# Patient Record
Sex: MALE | Race: WHITE | NOT HISPANIC OR LATINO | URBAN - METROPOLITAN AREA
[De-identification: names, ages, dates, MRNs, and addresses within clinical notes are randomized per-mention and may not be internally consistent; named-entity substitution may affect disease eponyms.]

---

## 2017-04-03 ENCOUNTER — NON-PROVIDER VISIT (OUTPATIENT)
Dept: URGENT CARE | Facility: CLINIC | Age: 57
End: 2017-04-03

## 2017-04-03 DIAGNOSIS — Z02.1 PRE-EMPLOYMENT DRUG SCREENING: ICD-10-CM

## 2017-04-03 LAB
AMP AMPHETAMINE: NORMAL
COC COCAINE: NORMAL
INT CON NEG: NORMAL
INT CON POS: NORMAL
MET METHAMPHETAMINES: NORMAL
OPI OPIATES: NORMAL
PCP PHENCYCLIDINE: NORMAL
POC DRUG COMMENT 753798-OCCUPATIONAL HEALTH: NORMAL
THC: NORMAL

## 2017-04-03 PROCEDURE — 80305 DRUG TEST PRSMV DIR OPT OBS: CPT | Performed by: PHYSICIAN ASSISTANT

## 2017-04-21 ENCOUNTER — NON-PROVIDER VISIT (OUTPATIENT)
Dept: URGENT CARE | Facility: CLINIC | Age: 57
End: 2017-04-21

## 2017-04-21 DIAGNOSIS — Z02.1 PRE-EMPLOYMENT DRUG TESTING: ICD-10-CM

## 2017-04-21 PROCEDURE — 8907 PR URINE COLLECT ONLY: Performed by: PHYSICIAN ASSISTANT

## 2019-03-07 ENCOUNTER — EMERGENCY (EMERGENCY)
Facility: HOSPITAL | Age: 59
LOS: 1 days | Discharge: HOME | End: 2019-03-07
Attending: STUDENT IN AN ORGANIZED HEALTH CARE EDUCATION/TRAINING PROGRAM
Payer: SELF-PAY

## 2019-03-07 VITALS
RESPIRATION RATE: 18 BRPM | HEART RATE: 51 BPM | OXYGEN SATURATION: 99 % | DIASTOLIC BLOOD PRESSURE: 80 MMHG | SYSTOLIC BLOOD PRESSURE: 144 MMHG

## 2019-03-07 VITALS — HEIGHT: 71 IN | WEIGHT: 205.03 LBS

## 2019-03-07 DIAGNOSIS — Z98.890 OTHER SPECIFIED POSTPROCEDURAL STATES: Chronic | ICD-10-CM

## 2019-03-07 DIAGNOSIS — Z90.49 ACQUIRED ABSENCE OF OTHER SPECIFIED PARTS OF DIGESTIVE TRACT: Chronic | ICD-10-CM

## 2019-03-07 LAB
ALBUMIN SERPL ELPH-MCNC: 4.4 G/DL — SIGNIFICANT CHANGE UP (ref 3.3–5.2)
ALP SERPL-CCNC: 84 U/L — SIGNIFICANT CHANGE UP (ref 40–120)
ALT FLD-CCNC: 19 U/L — SIGNIFICANT CHANGE UP
ANION GAP SERPL CALC-SCNC: 14 MMOL/L — SIGNIFICANT CHANGE UP (ref 5–17)
AST SERPL-CCNC: 21 U/L — SIGNIFICANT CHANGE UP
BASOPHILS # BLD AUTO: 0 K/UL — SIGNIFICANT CHANGE UP (ref 0–0.2)
BASOPHILS NFR BLD AUTO: 0.1 % — SIGNIFICANT CHANGE UP (ref 0–2)
BILIRUB SERPL-MCNC: 0.5 MG/DL — SIGNIFICANT CHANGE UP (ref 0.4–2)
BUN SERPL-MCNC: 18 MG/DL — SIGNIFICANT CHANGE UP (ref 8–20)
CALCIUM SERPL-MCNC: 9.9 MG/DL — SIGNIFICANT CHANGE UP (ref 8.6–10.2)
CHLORIDE SERPL-SCNC: 103 MMOL/L — SIGNIFICANT CHANGE UP (ref 98–107)
CO2 SERPL-SCNC: 23 MMOL/L — SIGNIFICANT CHANGE UP (ref 22–29)
CREAT SERPL-MCNC: 1 MG/DL — SIGNIFICANT CHANGE UP (ref 0.5–1.3)
EOSINOPHIL # BLD AUTO: 0.2 K/UL — SIGNIFICANT CHANGE UP (ref 0–0.5)
EOSINOPHIL NFR BLD AUTO: 2.2 % — SIGNIFICANT CHANGE UP (ref 0–5)
GLUCOSE SERPL-MCNC: 101 MG/DL — SIGNIFICANT CHANGE UP (ref 70–115)
HCT VFR BLD CALC: 41.8 % — LOW (ref 42–52)
HGB BLD-MCNC: 14.3 G/DL — SIGNIFICANT CHANGE UP (ref 14–18)
LYMPHOCYTES # BLD AUTO: 2.1 K/UL — SIGNIFICANT CHANGE UP (ref 1–4.8)
LYMPHOCYTES # BLD AUTO: 26.5 % — SIGNIFICANT CHANGE UP (ref 20–55)
MCHC RBC-ENTMCNC: 31.1 PG — HIGH (ref 27–31)
MCHC RBC-ENTMCNC: 34.2 G/DL — SIGNIFICANT CHANGE UP (ref 32–36)
MCV RBC AUTO: 90.9 FL — SIGNIFICANT CHANGE UP (ref 80–94)
MONOCYTES # BLD AUTO: 0.8 K/UL — SIGNIFICANT CHANGE UP (ref 0–0.8)
MONOCYTES NFR BLD AUTO: 9.7 % — SIGNIFICANT CHANGE UP (ref 3–10)
NEUTROPHILS # BLD AUTO: 4.9 K/UL — SIGNIFICANT CHANGE UP (ref 1.8–8)
NEUTROPHILS NFR BLD AUTO: 61.1 % — SIGNIFICANT CHANGE UP (ref 37–73)
PLATELET # BLD AUTO: 200 K/UL — SIGNIFICANT CHANGE UP (ref 150–400)
POTASSIUM SERPL-MCNC: 4 MMOL/L — SIGNIFICANT CHANGE UP (ref 3.5–5.3)
POTASSIUM SERPL-SCNC: 4 MMOL/L — SIGNIFICANT CHANGE UP (ref 3.5–5.3)
PROT SERPL-MCNC: 7.2 G/DL — SIGNIFICANT CHANGE UP (ref 6.6–8.7)
RBC # BLD: 4.6 M/UL — SIGNIFICANT CHANGE UP (ref 4.6–6.2)
RBC # FLD: 13.3 % — SIGNIFICANT CHANGE UP (ref 11–15.6)
SODIUM SERPL-SCNC: 140 MMOL/L — SIGNIFICANT CHANGE UP (ref 135–145)
T4 AB SER-ACNC: 7.6 UG/DL — SIGNIFICANT CHANGE UP (ref 4.5–12)
TSH SERPL-MCNC: 1.92 UIU/ML — SIGNIFICANT CHANGE UP (ref 0.27–4.2)
WBC # BLD: 8 K/UL — SIGNIFICANT CHANGE UP (ref 4.8–10.8)
WBC # FLD AUTO: 8 K/UL — SIGNIFICANT CHANGE UP (ref 4.8–10.8)

## 2019-03-07 PROCEDURE — 84443 ASSAY THYROID STIM HORMONE: CPT

## 2019-03-07 PROCEDURE — 70491 CT SOFT TISSUE NECK W/DYE: CPT

## 2019-03-07 PROCEDURE — 80053 COMPREHEN METABOLIC PANEL: CPT

## 2019-03-07 PROCEDURE — 70450 CT HEAD/BRAIN W/O DYE: CPT

## 2019-03-07 PROCEDURE — 84436 ASSAY OF TOTAL THYROXINE: CPT

## 2019-03-07 PROCEDURE — 99284 EMERGENCY DEPT VISIT MOD MDM: CPT | Mod: 25

## 2019-03-07 PROCEDURE — 96374 THER/PROPH/DIAG INJ IV PUSH: CPT | Mod: XU

## 2019-03-07 PROCEDURE — 70450 CT HEAD/BRAIN W/O DYE: CPT | Mod: 26

## 2019-03-07 PROCEDURE — 85027 COMPLETE CBC AUTOMATED: CPT

## 2019-03-07 PROCEDURE — 99284 EMERGENCY DEPT VISIT MOD MDM: CPT

## 2019-03-07 PROCEDURE — 70491 CT SOFT TISSUE NECK W/DYE: CPT | Mod: 26

## 2019-03-07 PROCEDURE — 36415 COLL VENOUS BLD VENIPUNCTURE: CPT

## 2019-03-07 RX ORDER — METOCLOPRAMIDE HCL 10 MG
10 TABLET ORAL ONCE
Qty: 0 | Refills: 0 | Status: COMPLETED | OUTPATIENT
Start: 2019-03-07 | End: 2019-03-07

## 2019-03-07 RX ADMIN — Medication 10 MILLIGRAM(S): at 20:54

## 2019-03-07 NOTE — ED STATDOCS - CLINICAL SUMMARY MEDICAL DECISION MAKING FREE TEXT BOX
Pt with headache, intermittent for several week, getting worse today. Pt states dx with thyroid nodule while in [place]. Feels pain is starting in that location. Willbotain basic labs including thyroid panel, CT head and neck, and reassess.

## 2019-03-07 NOTE — ED STATDOCS - NS ED ROS FT
No fever/chills, No eye pain/changes in vision, No ear pain/sore throat/dysphagia, No chest pain/palpitations, no SOB/cough/wheeze/stridor, No abdominal pain, No N/V/D, no dysuria/frequency/discharge, No neck/back pain, no rash, no changes in neurological status/function.  + headache, photophobia

## 2019-03-07 NOTE — ED STATDOCS - PROGRESS NOTE DETAILS
KARINE Ellis NOTE: Pt evaluated at bedside. Pt evaluated prior by intake physician. Otherwise HPI/PE/ROS as noted above. Will follow up plan per intake physician KARINE Ellis NOTE: Reviewed labs/Ct with attending. Pt reports improvement in headache s/p medications.  D/w Dr. Cardenas, will d/c home with PMD f/u. Pt has PMD to follow with. Pt informed of CT results and necessity of follow up for potential malignancy in thyroid nodule. KARINE Ellis NOTE: Patient stable for discharge, reports improvement in pain, vital signs stable, tolerating PO, ambulatory in ED.  Discussion with pt includes but is not limited to: results, plan, proper medication use and side effects, and return precautions. Patient will follow up with their PMD in 1-2 days and any specialists discussed. Advised patient to seek immediate medical attention for any new/worsening symptoms or concerns.  Patient provided with ample opportunity to ask questions which were answered to the fullest extent.  Patient given printed copies of lab/radiology results to aid in proper outpatient follow up. Patient verbalized understanding and agreement of plan.

## 2019-03-07 NOTE — ED ADULT TRIAGE NOTE - CHIEF COMPLAINT QUOTE
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 " Pt was in [facility] in November Pt A & OX4. Pt states he can't think clear and doesn't feel like himself.

## 2019-03-07 NOTE — ED STATDOCS - OBJECTIVE STATEMENT
Pt is a 59 y/o M presenting to the ED with c/o headache. 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 Pain is throbbing in nature, radiates into his ear and shoulder, and has progressively worsened. Associated photosensitivity. Denies dizziness, syncope, CP, SOB, fever, and neck pain. Pt notes that he ran out of his BP medications yesterday. Was evaluated for the same while in [facility] and found to have nodule in thyroid.

## 2019-03-07 NOTE — ED ADULT NURSE NOTE - NSIMPLEMENTINTERV_GEN_ALL_ED
Implemented All Universal Safety Interventions:  [place] to call system. Call bell, personal items and telephone within reach. Instruct patient to call for assistance. Room bathroom lighting operational. Non-slip footwear when patient is off stretcher. Physically safe environment: no spills, clutter or unnecessary equipment. Stretcher in lowest position, wheels locked, appropriate side rails in place.

## 2019-03-07 NOTE — ED ADULT NURSE NOTE - OBJECTIVE STATEMENT
Assumed pt care at 2000.  pt awake, alert and oriented x3 c/o right sided neck pain and headache.  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  Pt also notes light sensitivity.  denies N/V/D or visual changes.  Neuro intact.  Respirations even and unlabored, denies chest pain.  Abdomen soft, nontender, nondistended.  Skin warm and dry.  Moving all extremities well and with purpose.

## 2019-03-07 NOTE — ED STATDOCS - PHYSICAL EXAMINATION
Constitutional - well-developed; well nourished. Head - NCAT. Airway patent. Eyes - PERRL. CV - RRR. no murmur. no edema. Pulm - CTAB. Abd - soft, nt. no rebound. no guarding. Neuro - A&Ox3. strength 5/5 x4. sensation intact x4. normal gait. CN II-XII intact, Skin - No rash. MSK - normal ROM.

## 2019-04-18 ENCOUNTER — EMERGENCY (EMERGENCY)
Facility: HOSPITAL | Age: 59
LOS: 1 days | End: 2019-04-18
Attending: EMERGENCY MEDICINE
Payer: SELF-PAY

## 2019-04-18 VITALS
DIASTOLIC BLOOD PRESSURE: 95 MMHG | OXYGEN SATURATION: 97 % | SYSTOLIC BLOOD PRESSURE: 158 MMHG | HEIGHT: 70 IN | RESPIRATION RATE: 18 BRPM | HEART RATE: 62 BPM | TEMPERATURE: 99 F | WEIGHT: 205.03 LBS

## 2019-04-18 DIAGNOSIS — Z90.49 ACQUIRED ABSENCE OF OTHER SPECIFIED PARTS OF DIGESTIVE TRACT: Chronic | ICD-10-CM

## 2019-04-18 DIAGNOSIS — Z98.890 OTHER SPECIFIED POSTPROCEDURAL STATES: Chronic | ICD-10-CM

## 2019-04-18 PROBLEM — I10 ESSENTIAL (PRIMARY) HYPERTENSION: Chronic | Status: ACTIVE | Noted: 2019-03-07

## 2019-04-18 PROBLEM — E04.1 NONTOXIC SINGLE THYROID NODULE: Chronic | Status: ACTIVE | Noted: 2019-03-07

## 2019-04-18 LAB
ALBUMIN SERPL ELPH-MCNC: 4.1 G/DL — SIGNIFICANT CHANGE UP (ref 3.3–5.2)
ALP SERPL-CCNC: 86 U/L — SIGNIFICANT CHANGE UP (ref 40–120)
ALT FLD-CCNC: 15 U/L — SIGNIFICANT CHANGE UP
ANION GAP SERPL CALC-SCNC: 12 MMOL/L — SIGNIFICANT CHANGE UP (ref 5–17)
APTT BLD: 30.5 SEC — SIGNIFICANT CHANGE UP (ref 27.5–36.3)
AST SERPL-CCNC: 19 U/L — SIGNIFICANT CHANGE UP
BASOPHILS # BLD AUTO: 0 K/UL — SIGNIFICANT CHANGE UP (ref 0–0.2)
BASOPHILS NFR BLD AUTO: 0.4 % — SIGNIFICANT CHANGE UP (ref 0–2)
BILIRUB SERPL-MCNC: 0.4 MG/DL — SIGNIFICANT CHANGE UP (ref 0.4–2)
BUN SERPL-MCNC: 14 MG/DL — SIGNIFICANT CHANGE UP (ref 8–20)
CALCIUM SERPL-MCNC: 9.9 MG/DL — SIGNIFICANT CHANGE UP (ref 8.6–10.2)
CHLORIDE SERPL-SCNC: 105 MMOL/L — SIGNIFICANT CHANGE UP (ref 98–107)
CO2 SERPL-SCNC: 25 MMOL/L — SIGNIFICANT CHANGE UP (ref 22–29)
CREAT SERPL-MCNC: 0.87 MG/DL — SIGNIFICANT CHANGE UP (ref 0.5–1.3)
EOSINOPHIL # BLD AUTO: 0.7 K/UL — HIGH (ref 0–0.5)
EOSINOPHIL NFR BLD AUTO: 8.3 % — HIGH (ref 0–5)
GLUCOSE SERPL-MCNC: 97 MG/DL — SIGNIFICANT CHANGE UP (ref 70–115)
HCT VFR BLD CALC: 42.5 % — SIGNIFICANT CHANGE UP (ref 42–52)
HGB BLD-MCNC: 14.6 G/DL — SIGNIFICANT CHANGE UP (ref 14–18)
INR BLD: 1.07 RATIO — SIGNIFICANT CHANGE UP (ref 0.88–1.16)
LYMPHOCYTES # BLD AUTO: 2.1 K/UL — SIGNIFICANT CHANGE UP (ref 1–4.8)
LYMPHOCYTES # BLD AUTO: 25.3 % — SIGNIFICANT CHANGE UP (ref 20–55)
MCHC RBC-ENTMCNC: 31.7 PG — HIGH (ref 27–31)
MCHC RBC-ENTMCNC: 34.4 G/DL — SIGNIFICANT CHANGE UP (ref 32–36)
MCV RBC AUTO: 92.4 FL — SIGNIFICANT CHANGE UP (ref 80–94)
MONOCYTES # BLD AUTO: 0.7 K/UL — SIGNIFICANT CHANGE UP (ref 0–0.8)
MONOCYTES NFR BLD AUTO: 8.6 % — SIGNIFICANT CHANGE UP (ref 3–10)
NEUTROPHILS # BLD AUTO: 4.7 K/UL — SIGNIFICANT CHANGE UP (ref 1.8–8)
NEUTROPHILS NFR BLD AUTO: 57 % — SIGNIFICANT CHANGE UP (ref 37–73)
PLATELET # BLD AUTO: 185 K/UL — SIGNIFICANT CHANGE UP (ref 150–400)
POTASSIUM SERPL-MCNC: 4.4 MMOL/L — SIGNIFICANT CHANGE UP (ref 3.5–5.3)
POTASSIUM SERPL-SCNC: 4.4 MMOL/L — SIGNIFICANT CHANGE UP (ref 3.5–5.3)
PROT SERPL-MCNC: 6.9 G/DL — SIGNIFICANT CHANGE UP (ref 6.6–8.7)
PROTHROM AB SERPL-ACNC: 12.3 SEC — SIGNIFICANT CHANGE UP (ref 10–12.9)
RBC # BLD: 4.6 M/UL — SIGNIFICANT CHANGE UP (ref 4.6–6.2)
RBC # FLD: 13.2 % — SIGNIFICANT CHANGE UP (ref 11–15.6)
SODIUM SERPL-SCNC: 142 MMOL/L — SIGNIFICANT CHANGE UP (ref 135–145)
TROPONIN T SERPL-MCNC: <0.01 NG/ML — SIGNIFICANT CHANGE UP (ref 0–0.06)
TROPONIN T SERPL-MCNC: <0.01 NG/ML — SIGNIFICANT CHANGE UP (ref 0–0.06)
WBC # BLD: 8.2 K/UL — SIGNIFICANT CHANGE UP (ref 4.8–10.8)
WBC # FLD AUTO: 8.2 K/UL — SIGNIFICANT CHANGE UP (ref 4.8–10.8)

## 2019-04-18 PROCEDURE — 93010 ELECTROCARDIOGRAM REPORT: CPT

## 2019-04-18 PROCEDURE — 93880 EXTRACRANIAL BILAT STUDY: CPT | Mod: 26

## 2019-04-18 PROCEDURE — 99220: CPT

## 2019-04-18 PROCEDURE — 70548 MR ANGIOGRAPHY NECK W/DYE: CPT | Mod: 26

## 2019-04-18 PROCEDURE — 93306 TTE W/DOPPLER COMPLETE: CPT | Mod: 26

## 2019-04-18 PROCEDURE — 70544 MR ANGIOGRAPHY HEAD W/O DYE: CPT | Mod: 26,59

## 2019-04-18 PROCEDURE — 70450 CT HEAD/BRAIN W/O DYE: CPT | Mod: 26

## 2019-04-18 PROCEDURE — 70551 MRI BRAIN STEM W/O DYE: CPT | Mod: 26

## 2019-04-18 RX ORDER — ASPIRIN/CALCIUM CARB/MAGNESIUM 324 MG
325 TABLET ORAL ONCE
Qty: 0 | Refills: 0 | Status: COMPLETED | OUTPATIENT
Start: 2019-04-18 | End: 2019-04-18

## 2019-04-18 RX ORDER — SODIUM CHLORIDE 9 MG/ML
1000 INJECTION INTRAMUSCULAR; INTRAVENOUS; SUBCUTANEOUS ONCE
Qty: 0 | Refills: 0 | Status: COMPLETED | OUTPATIENT
Start: 2019-04-18 | End: 2019-04-18

## 2019-04-18 RX ADMIN — SODIUM CHLORIDE 1000 MILLILITER(S): 9 INJECTION INTRAMUSCULAR; INTRAVENOUS; SUBCUTANEOUS at 15:33

## 2019-04-18 RX ADMIN — Medication 325 MILLIGRAM(S): at 15:33

## 2019-04-18 NOTE — ED CDU PROVIDER INITIAL DAY NOTE - OBJECTIVE STATEMENT
58yoM with h/o HTN, presenting with transient episode of L eye visual loss around 2 hours PTA which resolved after a few minutes. Pt states he noticed the center of his black eye went all black and states that the circumferential vision in that eye was intact. No pain or flashing lights.  He denies any headache, numbness, tingling, weakness.  Pt states symptoms completely returned. Denies any h/o stroke. Pt was previously taking antihypertensives but stopped them after his last two medical visits revealed his BP to be normal; he discontinued the losartan himself without consulting with his doctor. Denies smoking/ drinking/ drug use.  Pt denies any history of stroke/ TIA but states father had a ICH but also had HTN, was obese and a smoker.  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  Pt denies any complaints at this time.

## 2019-04-18 NOTE — ED PROVIDER NOTE - CLINICAL SUMMARY MEDICAL DECISION MAKING FREE TEXT BOX
Pt presents with transient visual loss; now resolved.  CTh negative; vision now improved;  Plan to f/u labs,  admit to OBS for TIA workup
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
## 2019-04-18 NOTE — ED CDU PROVIDER INITIAL DAY NOTE - MEDICAL DECISION MAKING DETAILS
Pt with few minutes of visual change. Will keep in obs for MRI/MRA/Lipid panel and ECHO with neuro consult.

## 2019-04-18 NOTE — ED ADULT NURSE NOTE - GASTROINTESTINAL WDL
Regarding: heart palpitations  ----- Message from Sobia Trevizo sent at 7/10/2018  8:02 PM CDT -----  Patient Name: Jackie Keys  Specialist or PCP:Dr Jacinto  Pregnant (If Yes, how long?):no  Symptoms:heart palpitations  Call Back #:[phone]  Is the patient’s permanent residence located in [place], [place], or a compact State? Yes [facility] [address]  Call Center Account #:[id number]       Abdomen soft, nontender, nondistended, bowel sounds present in all 4 quadrants.

## 2019-04-18 NOTE — ED CDU PROVIDER INITIAL DAY NOTE - ATTENDING CONTRIBUTION TO CARE
I, Abbey Bauman, participated in the care of this patient with the PA. I discussed the history and physical exam findings as well as lab results and plan of care with the PA. I agree with PA's history, physical and assessment.     57 y/o M who presented as Code Stroke for transient left eye visual loss which resolved. Risk factors of HTN, patient reports he had a carotid doppler in [place], but it was recommended that he have an evaluation when he returned home, which he has not yet done. Will place patient in observation for MRI/MRA and echo and neurology consult.

## 2019-04-18 NOTE — ED ADULT NURSE NOTE - CAS EDN DISCHARGE ASSESSMENT
Patient baseline mental status/Symptoms improved/Alert and oriented to person, place and time/Dressing clean and dry/Awake
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
## 2019-04-18 NOTE — STROKE CODE NOTE - ER ARRIVAL: DATE/TIME
Pt feeling better now with BG recheck of 108. Episode was reported to Dr. Anastasiia Willard via charge nurse.  Ok to take 5mg glipizide due at 5pm. 18-Apr-2019 15:04

## 2019-04-18 NOTE — ED PROVIDER NOTE - OBJECTIVE STATEMENT
58yoM with h/o HTN, presenting with transient episode of L eye visual loss around 2 hours PTA which resolved after a few minutes. Pt states he noticed the center of his black eye went all black and states that the circumferential vision in that eye was intact. No pain or flashing lights.  He denies any headache, numbness, tingling, weakness.  Pt states symptoms completely returned. Denies any h/o stroke. Pt was previously taking antihypertensives but stopped them after his last two medical visits revealed his BP to be normal; he discontinued the losartan himself without consulting with his doctor. Denies smoking/ drinking/ drug use.  Pt denies any history of stroke/ TIA but states father had a ICH but also had HTN, was obese and a smoker.  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

## 2019-04-18 NOTE — ED ADULT NURSE REASSESSMENT NOTE - INTERVENTIONS DEFINITIONS
Stretcher in lowest position, wheels locked, appropriate side rails in place/Physically safe environment: no spills, clutter or unnecessary equipment/Monitor for mental status changes and reorient to person, place, and time/Call bell, personal items and telephone within reach/[place] to call system Stretcher in lowest position, wheels locked, appropriate side rails in place/[place] to call system/Call bell, personal items and telephone within reach/Non-slip footwear when patient is off stretcher/Physically safe environment: no spills, clutter or unnecessary equipment

## 2019-04-18 NOTE — PHARMACOTHERAPY INTERVENTION NOTE - COMMENTS
Spoke to patient at bedside. Is on no prescription or OTC medications.
Stroke  No tPA; resolution of symptoms

## 2019-04-19 VITALS
DIASTOLIC BLOOD PRESSURE: 79 MMHG | HEART RATE: 58 BPM | TEMPERATURE: 97 F | OXYGEN SATURATION: 98 % | SYSTOLIC BLOOD PRESSURE: 142 MMHG | RESPIRATION RATE: 18 BRPM

## 2019-04-19 DIAGNOSIS — G45.9 TRANSIENT CEREBRAL ISCHEMIC ATTACK, UNSPECIFIED: ICD-10-CM

## 2019-04-19 LAB
CHOLEST SERPL-MCNC: 180 MG/DL — SIGNIFICANT CHANGE UP (ref 110–199)
HDLC SERPL-MCNC: 38 MG/DL — LOW
LIPID PNL WITH DIRECT LDL SERPL: 122 MG/DL — SIGNIFICANT CHANGE UP
TOTAL CHOLESTEROL/HDL RATIO MEASUREMENT: 5 RATIO — SIGNIFICANT CHANGE UP (ref 3.4–9.6)
TRIGL SERPL-MCNC: 101 MG/DL — SIGNIFICANT CHANGE UP (ref 10–200)

## 2019-04-19 PROCEDURE — 93005 ELECTROCARDIOGRAM TRACING: CPT

## 2019-04-19 PROCEDURE — 99217: CPT

## 2019-04-19 PROCEDURE — 84484 ASSAY OF TROPONIN QUANT: CPT

## 2019-04-19 PROCEDURE — 85730 THROMBOPLASTIN TIME PARTIAL: CPT

## 2019-04-19 PROCEDURE — 80053 COMPREHEN METABOLIC PANEL: CPT

## 2019-04-19 PROCEDURE — 36415 COLL VENOUS BLD VENIPUNCTURE: CPT

## 2019-04-19 PROCEDURE — 80061 LIPID PANEL: CPT

## 2019-04-19 PROCEDURE — 85027 COMPLETE CBC AUTOMATED: CPT

## 2019-04-19 PROCEDURE — 70551 MRI BRAIN STEM W/O DYE: CPT

## 2019-04-19 PROCEDURE — 85610 PROTHROMBIN TIME: CPT

## 2019-04-19 PROCEDURE — G0378: CPT

## 2019-04-19 PROCEDURE — 70450 CT HEAD/BRAIN W/O DYE: CPT

## 2019-04-19 PROCEDURE — 93880 EXTRACRANIAL BILAT STUDY: CPT

## 2019-04-19 PROCEDURE — 93306 TTE W/DOPPLER COMPLETE: CPT

## 2019-04-19 PROCEDURE — 99284 EMERGENCY DEPT VISIT MOD MDM: CPT | Mod: 25

## 2019-04-19 PROCEDURE — 70544 MR ANGIOGRAPHY HEAD W/O DYE: CPT

## 2019-04-19 PROCEDURE — 70548 MR ANGIOGRAPHY NECK W/DYE: CPT

## 2019-04-19 PROCEDURE — 82962 GLUCOSE BLOOD TEST: CPT

## 2019-04-19 RX ORDER — LOSARTAN POTASSIUM 100 MG/1
1 TABLET, FILM COATED ORAL
Qty: 30 | Refills: 0 | OUTPATIENT
Start: 2019-04-19 | End: 2019-05-18

## 2019-04-19 RX ORDER — ATORVASTATIN CALCIUM 80 MG/1
1 TABLET, FILM COATED ORAL
Qty: 30 | Refills: 0 | OUTPATIENT
Start: 2019-04-19 | End: 2019-05-18

## 2019-04-19 RX ORDER — ASPIRIN/CALCIUM CARB/MAGNESIUM 324 MG
1 TABLET ORAL
Qty: 30 | Refills: 0 | OUTPATIENT
Start: 2019-04-19 | End: 2019-05-18

## 2019-04-19 NOTE — ED CDU PROVIDER SUBSEQUENT DAY NOTE - ATTENDING CONTRIBUTION TO CARE
I, Abbey Bauman, participated in the care of this patient with the PA. I discussed the history and physical exam findings as well as lab results and plan of care with the PA. I agree with PA's history, physical and assessment.

## 2019-04-19 NOTE — ED ADULT NURSE REASSESSMENT NOTE - GENERAL PATIENT STATE
improvement verbalized/resting/sleeping/comfortable appearance/cooperative/smiling/interactive
improvement verbalized/resting/sleeping/smiling/interactive/comfortable appearance/cooperative
comfortable appearance/cooperative
comfortable appearance/resting/sleeping
improvement verbalized/smiling/interactive/resting/sleeping/comfortable appearance/cooperative

## 2019-04-19 NOTE — CONSULT NOTE ADULT - ASSESSMENT
possible tia, source undetermined, recommend aspirin 325 mg, lipitor 20 mg, blood pressure manangement. recommend outpatient cardiology, endocrinology and ophthalmology evaluations.

## 2019-04-19 NOTE — ED ADULT NURSE REASSESSMENT NOTE - COMFORT CARE
meal provided
darkened lights/plan of care explained/ambulated to bathroom/wait time explained/meal provided
plan of care explained/wait time explained/darkened lights
darkened lights
plan of care explained/darkened lights/po fluids offered/repositioned/side rails down
plan of care explained/meal provided/po fluids offered/wait time explained

## 2019-04-19 NOTE — ED ADULT NURSE REASSESSMENT NOTE - ANCILLARY STATUS
awaiting radiology
radiology results pending
cardiovascular tests pending
cardiovascular tests pending
lipid profile/awaiting lab draw

## 2019-04-19 NOTE — ED CDU PROVIDER SUBSEQUENT DAY NOTE - HISTORY
58yoM with h/o HTN, presenting with transient episode of L eye visual loss around 2 hours PTA which resolved after a few minutes. Pt asymptomatic while in CDU, no recurrence of symptoms. MR head, MRA head and neck normal. No further complaints

## 2019-04-19 NOTE — ED CDU PROVIDER SUBSEQUENT DAY NOTE - MEDICAL DECISION MAKING DETAILS
Pt asymptomatic at this time, Trop neg x 2, MR head, MRA head and neck normal. Pt awaiting lipid panel, TTE results and Neuro consult.

## 2019-04-19 NOTE — ED CDU PROVIDER DISPOSITION NOTE - CLINICAL COURSE
TIA workup - discussed incidental findings with patient - cleared by neuro - will d/c on losartan, aspirin and lipitor

## 2019-04-19 NOTE — ED ADULT NURSE REASSESSMENT NOTE - NS ED NURSE REASSESS COMMENT FT1
Pt alert and oriented, no apparent distress noted at this time. Pt handed off to kin MCGRAW in stable condition.
Report given to CDU RN Dannielle Leach. Patient A&Ox4, vital signs stable. Patient in stable condition. No obvious distress noted.
assumed care of pt @ 1930, report received from yola DYE RN, charting as noted. Pt AOx4 in NAD, Vital Signs Stable, pt denies any loss of vision at this time. No neuro deficits noted, no facial droop, strength and sensation equal bilaterally. NIH 0, GCS 15. HR is sinus fox on cradiac monitor, lung sounds are clear b/l, abd is soft and nontender with positive bowel sounds in all four quadrants, skin is warm, dry and appropriate for age and race. pt educated on plan of care and observation stay. Plan of care taught back to RN. Proficiency determined from successful pt teach back. Pt oriented to unit, staff, and room. Pt reeducated on call bell use. Bed locked in lowest position, call bell within reach. All questions and concerns addressed.
Pt A&Ox4, +PERRL, no neuro deficits at this time, pt denies any vision disturbances. Patient resting comfortably in bed. Vital signs stable. No obvious distress noted. No complaints at this time. Awaiting MRI results. Will continue to monitor.
pt resting in stretcher in NAD, Vital Signs Stable, NSR on cardiac monitor. Pt outstanding for full lipid panel in AM. Neuro consult in AM.
Pt A&Ox4, VSS, no neuro deficits noted, pt currently denies any vision disturbances. Patient resting comfortably. Vital signs stable. No obvious distress noted. No complaints at this time. Will continue to monitor.

## 2019-04-19 NOTE — ED CDU PROVIDER DISPOSITION NOTE - ATTENDING CONTRIBUTION TO CARE
I, Lincoln Fritz, performed a face to face bedside interview with this patient regarding history of present illness, review of symptoms and relevant past medical, social and family history.  I completed an independent physical examination. I have communicated the patient’s plan of care and disposition with the ACP.  58 year old male presents wioth episode of monocular, painless vision loss, now resolved. placed on Obs for tia work up, seen by neuro, imaging neg, will dc on statin, asa, ace, consistent with amarosis fuggax  Gen: NAD, well appearing  CV: RRR  Pul: CTA b/l  Abd: Soft, non-distended, non-tender  Neuro: no focal deficits  Pt improved, stable for dc

## 2020-01-13 NOTE — ED ADULT NURSE REASSESSMENT NOTE - GLASGOW COMA SCALE: SCORE
----- Message from Billie Saldaña sent at 1/13/2020  4:20 PM EST -----  Regarding: Prescription Question  Contact: [phone]  Please send a new prescription for Jardiance 90 day supply to [facility] mail order. Their fax number is [phone]. It is cheaper because [facility] pharmacy is charging $102.88 for a 30 day supply. My insurance said to get it through the mail order.     Thanks,  Rosetta Ma
15
15

## 2020-04-24 NOTE — ED PROVIDER NOTE - NS ED MD EM SELECTION
Lay, MD Mulugeta Ambrosio William W. RN    Caller: Unspecified (Today, 10:10 AM)               Pls ask him to have an echo soon (in next 2 weeks) and then clinic visit.  Can be on same day in person visit .  Thanks.          Phone call back to patient to tell him that Dr. Chun recommends an echocardiogram and clinic visit in the next two weeks. Patient agrees to this. I transferred him to scheduling and requested of  that if at all possible to get echo and clinic visit for same day.   [id number] [facility]

## 2020-06-27 NOTE — ED ADULT NURSE NOTE - DISCHARGE DATE/TIME
Pt given urinal and notified of need of urine sample  
Report received from JAGDISH Madrigal; pt resting calmly awake in bed watching tv; pt aware of plan of care at this time; will continue to monitor  
07-Mar-2019 23:11

## 2021-10-10 NOTE — CONSULT NOTE ADULT - SUBJECTIVE AND OBJECTIVE BOX
HPI:  59 yo h/o htn, developed left eye loss of vision for 2 minutes, no lateralizing weakness, or speech problems, or headaches. stable since admission.     PAST MEDICAL & SURGICAL HISTORY:  Thyroid nodule  HTN (hypertension)  S/P appendectomy  H/O umbilical hernia repair      REVIEW OF SYSTEMS:    CONSTITUTIONAL: No fever, weight loss, or fatigue  EYES: No eye pain, visual disturbances, or discharge  ENMT:  No difficulty hearing, tinnitus, vertigo; No sinus or throat pain  NECK: No pain or stiffness  RESPIRATORY: No cough, wheezing, chills or hemoptysis; No shortness of breath  CARDIOVASCULAR: No chest pain, palpitations, dizziness, or leg swelling  GASTROINTESTINAL: No abdominal or epigastric pain. No nausea, vomiting, or hematemesis; No diarrhea or constipation. No melena or hematochezia.  GENITOURINARY: No dysuria, frequency, hematuria, or incontinence  NEUROLOGICAL: No headaches, memory loss, loss of strength, numbness, or tremors  SKIN: No itching, burning, rashes, or lesions   LYMPH NODES: No enlarged glands  ENDOCRINE: No heat or cold intolerance; No hair loss  MUSCULOSKELETAL: No joint pain or swelling; No muscle, back, or extremity pain  PSYCHIATRIC: No depression, anxiety, mood swings, or difficulty sleeping  HEME/LYMPH: No easy bruising, or bleeding gums    MEDICATIONS  (STANDING):    MEDICATIONS  (PRN):      Allergies    penicillin (Hives; Rash)    Intolerances        SOCIAL HISTORY:    FAMILY HISTORY:      PHYSICAL EXAM:  Vital Signs Last 24 Hrs  T(F): 97.3 (04-19-19 @ 11:02)  HR: 58 (04-19-19 @ 11:02)  BP: 142/79 (04-19-19 @ 11:02)  RR: 18 (04-19-19 @ 11:02)    GENERAL: NAD, well-groomed, well-developed  HEAD:  Atraumatic,   EYES: EOMI, PERRLA, conjunctiva and sclera clear  NECK: Supple,  NERVOUS SYSTEM:  Alert & Oriented X3, speech and language normal, cranial nerves II-XII normal,   Good concentration; Motor Strength 5/5 B/L upper and lower extremities; DTRs 2+ intact and symmetric, plantar responses flexor bilaterally, sensory exam normal to light touch, pin prick and position sense, no dysmetri bilaterally  HEART: Regular rate and rhythm; No murmurs, rubs, or gallops          LABS:                        14.6   8.2   )-----------( 185      ( 18 Apr 2019 15:24 )             42.5     04-18    142  |  105  |  14.0  ----------------------------<  97  4.4   |  25.0  |  0.87    Ca    9.9      18 Apr 2019 15:24    TPro  6.9  /  Alb  4.1  /  TBili  0.4  /  DBili  x   /  AST  19  /  ALT  15  /  AlkPhos  86  04-18    PT/INR - ( 18 Apr 2019 15:24 )   PT: 12.3 sec;   INR: 1.07 ratio         PTT - ( 18 Apr 2019 15:24 )  PTT:30.5 sec      RADIOLOGY & ADDITIONAL STUDIES:
Opt out

## 2022-07-12 NOTE — ED ADULT NURSE NOTE - CHIEF COMPLAINT QUOTE
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 " Pt was in [facility] in November Pt A & OX4. Pt states he can't think clear and doesn't feel like himself.
[Joint Pain] : joint pain

## 2023-04-10 PROBLEM — Z00.00 ENCOUNTER FOR PREVENTIVE HEALTH EXAMINATION: Status: ACTIVE | Noted: 2023-04-10

## 2023-06-02 ENCOUNTER — APPOINTMENT (OUTPATIENT)
Dept: HEPATOLOGY | Facility: CLINIC | Age: 63
End: 2023-06-02
Payer: SELF-PAY

## 2023-06-02 VITALS
OXYGEN SATURATION: 95 % | TEMPERATURE: 97 F | HEART RATE: 72 BPM | RESPIRATION RATE: 16 BRPM | WEIGHT: 196 LBS | DIASTOLIC BLOOD PRESSURE: 105 MMHG | BODY MASS INDEX: 27.44 KG/M2 | HEIGHT: 71 IN | SYSTOLIC BLOOD PRESSURE: 191 MMHG

## 2023-06-02 VITALS
DIASTOLIC BLOOD PRESSURE: 92 MMHG | OXYGEN SATURATION: 95 % | HEART RATE: 64 BPM | RESPIRATION RATE: 16 BRPM | SYSTOLIC BLOOD PRESSURE: 173 MMHG

## 2023-06-02 DIAGNOSIS — R35.89 OTHER POLYURIA: ICD-10-CM

## 2023-06-02 DIAGNOSIS — I10 ESSENTIAL (PRIMARY) HYPERTENSION: ICD-10-CM

## 2023-06-02 DIAGNOSIS — R63.1 POLYDIPSIA: ICD-10-CM

## 2023-06-02 DIAGNOSIS — Z78.9 OTHER SPECIFIED HEALTH STATUS: ICD-10-CM

## 2023-06-02 DIAGNOSIS — E66.3 OVERWEIGHT: ICD-10-CM

## 2023-06-02 DIAGNOSIS — Z86.79 PERSONAL HISTORY OF OTHER DISEASES OF THE CIRCULATORY SYSTEM: ICD-10-CM

## 2023-06-02 PROCEDURE — 99204 OFFICE O/P NEW MOD 45 MIN: CPT

## 2023-06-02 RX ORDER — PNV NO.95/FERROUS FUM/FOLIC AC 28MG-0.8MG
TABLET ORAL
Refills: 0 | Status: ACTIVE | COMMUNITY

## 2023-06-02 RX ORDER — LISINOPRIL 10 MG/1
10 TABLET ORAL
Refills: 0 | Status: ACTIVE | COMMUNITY

## 2023-06-02 RX ORDER — OMEGA-3/DHA/EPA/FISH OIL 300-1000MG
CAPSULE ORAL
Refills: 0 | Status: ACTIVE | COMMUNITY

## 2023-06-02 RX ORDER — TURMERIC ROOT EXTRACT 500 MG
TABLET ORAL
Refills: 0 | Status: DISCONTINUED | COMMUNITY
End: 2023-06-02

## 2023-06-04 PROBLEM — R63.1 POLYDIPSIA: Status: ACTIVE | Noted: 2023-06-04

## 2023-06-04 PROBLEM — E66.3 OVERWEIGHT (BMI 25.0-29.9): Status: ACTIVE | Noted: 2023-06-04

## 2023-06-04 PROBLEM — R35.89 POLYURIA: Status: ACTIVE | Noted: 2023-06-04

## 2023-06-04 NOTE — HISTORY OF PRESENT ILLNESS
[Hemodialysis] : no hemodialysis [Transfusion before 1992] : no transfusion before 1992 [Transplant before 1992] : no transplant before 1992 [Travel to Endemic Area] : travel to an endemic area [FreeTextEntry1] : 61 yo Male, , with Hx of HTN, rheumatic fever as child, chronic hepatitis C (Dx 1980s as non A non B during blood donation, and later verified as Hep C), treatment naive, is here for initial evaluation. 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 He has social alcohol use, but denies any heavy drinking. He is Nepali decent.

## 2023-06-04 NOTE — PHYSICAL EXAM
[Scleral Icterus] : No Scleral Icterus [Spider Angioma] : No spider angioma(s) were observed [Abdominal  Ascites] : no ascites [Non-Tender] : non-tender [Asterixis] : no asterixis observed [Jaundice] : No jaundice [Palmar Erythema] : no Palmar Erythema [General Appearance - Alert] : alert [General Appearance - In No Acute Distress] : in no acute distress [General Appearance - Well Nourished] : well nourished [General Appearance - Well Developed] : well developed [Sclera] : the sclera and conjunctiva were normal [Oropharynx] : the oropharynx was normal [Neck Appearance] : the appearance of the neck was normal [Jugular Venous Distention Increased] : there was no jugular-venous distention [] : no respiratory distress [Exaggerated Use Of Accessory Muscles For Inspiration] : no accessory muscle use [Respiration, Rhythm And Depth] : normal respiratory rhythm and effort [Auscultation Breath Sounds / Voice Sounds] : lungs were clear to auscultation bilaterally [Heart Rate And Rhythm] : heart rate was normal and rhythm regular [Heart Sounds] : normal S1 and S2 [Edema] : there was no peripheral edema [Flat] : flat [Normal] : normal [Dilated Collat. Veins] : no collateral vein dilation [Soft, Nontender] : the abdomen was soft and nontender [Firm] : not firm [Rigid] : not rigid [Rebound] : no rebound [Guarding] : no guarding [Ball's] : a negative Ball's sign [Liver Tender To Palpation] : not tender [None] : no CVA tenderness [Cervical Lymph Nodes Enlarged Posterior Bilaterally] : posterior cervical [Cervical Lymph Nodes Enlarged Anterior Bilaterally] : anterior cervical [Supraclavicular Lymph Nodes Enlarged Bilaterally] : supraclavicular [Femoral Lymph Nodes Enlarged Bilaterally] : femoral [Axillary Lymph Nodes Enlarged Bilaterally] : axillary [Inguinal Lymph Nodes Enlarged Bilaterally] : inguinal [No CVA Tenderness] : no ~M costovertebral angle tenderness [No Spinal Tenderness] : no spinal tenderness [Abnormal Walk] : normal gait [Skin Color & Pigmentation] : normal skin color and pigmentation [FreeTextEntry1] : Grossly intact [Oriented To Time, Place, And Person] : oriented to person, place, and time [Affect] : the affect was normal [Impaired Insight] : insight and judgment were intact [Mood] : the mood was normal

## 2023-06-04 NOTE — REVIEW OF SYSTEMS
[Feeling Tired] : feeling tired [Eyesight Problems] : eyesight problems [Arthralgias] : arthralgias [As Noted in HPI] : as noted in HPI [Negative] : Heme/Lymph [Fever] : no fever [Chills] : no chills [Recent Weight Gain (___ Lbs)] : no recent weight gain [Recent Weight Loss (___ Lbs)] : no recent weight loss [Chest Pain] : no chest pain [Palpitations] : no palpitations [Lower Ext Edema] : no extremity edema [Abdominal Pain] : no abdominal pain [Vomiting] : no vomiting [Constipation] : no constipation [Diarrhea] : no diarrhea [Heartburn] : no heartburn [Melena] : no melena [Dysuria] : no dysuria [Confused] : no confusion [FreeTextEntry3] : Blurry vision [FreeTextEntry7] : Itching belt like and small papules. No nausea. Hx of constipation, now controlled with diet. No hematochezia [FreeTextEntry8] : Frequency. polyuria, polydipsia [FreeTextEntry9] : chronic knee pain

## 2023-08-21 PROBLEM — Z00.00 ENCOUNTER FOR PREVENTIVE HEALTH EXAMINATION: Status: ACTIVE | Noted: 2023-08-21

## 2023-10-17 ENCOUNTER — APPOINTMENT (OUTPATIENT)
Dept: ULTRASOUND IMAGING | Facility: CLINIC | Age: 63
End: 2023-10-17
Payer: SELF-PAY

## 2023-10-17 ENCOUNTER — APPOINTMENT (OUTPATIENT)
Dept: HEPATOLOGY | Facility: CLINIC | Age: 63
End: 2023-10-17
Payer: SELF-PAY

## 2023-10-17 ENCOUNTER — OUTPATIENT (OUTPATIENT)
Dept: OUTPATIENT SERVICES | Facility: HOSPITAL | Age: 63
LOS: 1 days | End: 2023-10-17
Payer: SELF-PAY

## 2023-10-17 DIAGNOSIS — Z90.49 ACQUIRED ABSENCE OF OTHER SPECIFIED PARTS OF DIGESTIVE TRACT: Chronic | ICD-10-CM

## 2023-10-17 DIAGNOSIS — B18.2 CHRONIC VIRAL HEPATITIS C: ICD-10-CM

## 2023-10-17 DIAGNOSIS — Z98.890 OTHER SPECIFIED POSTPROCEDURAL STATES: Chronic | ICD-10-CM

## 2023-10-17 PROCEDURE — 76700 US EXAM ABDOM COMPLETE: CPT | Mod: 26

## 2023-10-17 PROCEDURE — 76981 USE PARENCHYMA: CPT

## 2023-10-17 PROCEDURE — 76700 US EXAM ABDOM COMPLETE: CPT

## 2023-10-23 PROBLEM — B18.2 CHRONIC HEPATITIS C: Status: ACTIVE | Noted: 2023-06-02

## 2023-11-08 DIAGNOSIS — R93.429 ABNORMAL RADIOLOGIC FINDINGS ON DIAGNOSITIC IMAGING OF UNSPECIFIED KIDNEY: ICD-10-CM

## 2023-11-08 LAB
AFP-TM SERPL-MCNC: <1.8 NG/ML
ALBUMIN SERPL ELPH-MCNC: 4.4 G/DL
ALP BLD-CCNC: 96 U/L
ALT SERPL-CCNC: 15 U/L
ANION GAP SERPL CALC-SCNC: 9 MMOL/L
AST SERPL-CCNC: 17 U/L
BILIRUB DIRECT SERPL-MCNC: 0.2 MG/DL
BILIRUB INDIRECT SERPL-MCNC: 0.6 MG/DL
BILIRUB SERPL-MCNC: 0.8 MG/DL
BUN SERPL-MCNC: 14 MG/DL
CALCIUM SERPL-MCNC: 10.1 MG/DL
CHLORIDE SERPL-SCNC: 104 MMOL/L
CHOLEST SERPL-MCNC: 204 MG/DL
CO2 SERPL-SCNC: 27 MMOL/L
CREAT SERPL-MCNC: 0.89 MG/DL
EGFR: 96 ML/MIN/1.73M2
ESTIMATED AVERAGE GLUCOSE: 105 MG/DL
FERRITIN SERPL-MCNC: 367 NG/ML
FIBROSIS SCORE: 0.29
FIBROSIS STAGE: NORMAL
FIBROSURE ALPHA 2 MACROGLOBULINS: 203 MG/DL
FIBROSURE ALT (SGPT): 21 IU/L
FIBROSURE APOLIPOPROTEIN A1: 154 MG/DL
FIBROSURE COMMENT 2: NORMAL
FIBROSURE GGT: 14 IU/L
FIBROSURE HAPTOGLOBIN: 110 MG/DL
FIBROSURE INTERPRETATIONS: NORMAL
FIBROSURE LIMITATIONS: NORMAL
FIBROSURE NECROINFLAMM ACTIVITY SCORING: NORMAL
FIBROSURE NECROINFLAMMAT ACTIVITY GRPFIBROSURE NECROINFLAMMA: NORMAL
FIBROSURE NECROINFLAMMAT ACTIVITY SCORE: 0.08
FIBROSURE SCORING: NORMAL
FIBROSURE TOTAL BILIRUBIN: 0.7 MG/DL
GLUCOSE SERPL-MCNC: 97 MG/DL
HBA1C MFR BLD HPLC: 5.3 %
HBV CORE IGG+IGM SER QL: NONREACTIVE
HBV SURFACE AB SER QL: NONREACTIVE
HBV SURFACE AG SER QL: NONREACTIVE
HCV GENTYP BLD NAA+PROBE: NOT DETECTED
HCV RNA SERPL NAA+PROBE-LOG IU: NOT DETECTED LOGIU/ML
HDLC SERPL-MCNC: 54 MG/DL
HEPATITIS A IGG ANTIBODY: NONREACTIVE
HEPC RNA INTERP: NOT DETECTED
HIV1+2 AB SPEC QL IA.RAPID: NONREACTIVE
INR PPP: 0.95 RATIO
IRON SATN MFR SERPL: 42 %
IRON SERPL-MCNC: 128 UG/DL
LDLC SERPL CALC-MCNC: 135 MG/DL
NONHDLC SERPL-MCNC: 150 MG/DL
POTASSIUM SERPL-SCNC: 5.1 MMOL/L
PROT SERPL-MCNC: 7 G/DL
PT BLD: 10.8 SEC
SODIUM SERPL-SCNC: 140 MMOL/L
TIBC SERPL-MCNC: 305 UG/DL
TRIGL SERPL-MCNC: 86 MG/DL
TSH SERPL-ACNC: 1.32 UIU/ML
UIBC SERPL-MCNC: 178 UG/DL
